# Patient Record
Sex: FEMALE | Race: WHITE
[De-identification: names, ages, dates, MRNs, and addresses within clinical notes are randomized per-mention and may not be internally consistent; named-entity substitution may affect disease eponyms.]

---

## 2017-01-05 ENCOUNTER — LACTATION ENCOUNTER (OUTPATIENT)
Age: 32
End: 2017-01-05

## 2017-01-05 NOTE — LACTATION NOTE
"This note was copied from the chart of Bety Guardado.  D:  I observed a breastfeeding (babe with hx of desats with oral feeds).  I:  Bety had been \"stirring\" before I came into the room and it had been 3 hours since last feeding, so Nancy got her up.  She did not display strong hunger cues nor seem very awake.  Mom did and excellent job with positioning and latching.  She stated she could feel a letdown coming and wondered if she should unlatch her; I told her to do what she normally did (not change her routine since I was there).  During the letdown Bety kept sucking but did not pause to breathe and had a heart rate dip and desat.  I then had Nancy unlatch her.  She relatched her a few more times and Bety would grow tired, breathe periodically and drop her sats.  Mom did a great job noting when her breathing became periodic and began stimulating her.  At first she would stim her while still latched jaime nd Bety would start to suck from the stimulation (vs breathe); I advised her to unlatch before stimulating so she could concentrate on breathing.  Mom initially wanted to relatch her when her color came back and she started breathing again; I advised her to wait until she was more awake than that (cueing more srongly; her face was still \"checked out\").  After about 10 minutes she did not wake and cue again; she was put in the kangaroo position to see if that would wake her and it did not; per scale she took 0ml.  After cuddling a little longer at breast she then perked up and took 42ml.  We also talked abotu her pumping; maternal grandma used to be an IBCLC and advised Nancy to pump less often to avoid oversupply.  I asked if she had ever logged and she had not, but stated she gets 4-6oz per pumping, 6-8x/day.  I showed her the math, that those volumes were a wide range between normal supply and oversupply (24-48 oz/day) and advised her to log before she changed up her pumpings too " "much.  I gave her a \"Magic Number\" sheet which describes how to safely and comfortably wean her pumping in the future while still maintaining supply.  A:  Immature, sleepy, tired baby who has a strong desire to suck but is immature in breathing pattern.  P:  Will continue to provide lactation support.    Ese Hall, RNC, IBCLC    "

## 2017-01-15 ENCOUNTER — LACTATION ENCOUNTER (OUTPATIENT)
Age: 32
End: 2017-01-15

## 2017-01-15 NOTE — LACTATION NOTE
"This note was copied from the chart of Bety Guardado.  Discharge Instructions for Bety guardado    Pumping:  Continue to pump after every feeding until Bety is no longer needing any supplements and is able to take all feedings at breast.  Then wean from pumping as described in the blue handout.    Nipple Shield:  Continue to use until Bety is taking all feedings at breast and suck is NOTICEABLY stronger, then wean as described in yellow handout.  Typically, this is the last to go (usually wean from bottles 1st, then the pump 2nd)    Supplementation:  Supplement as needed/ medically ordered.  Read through the purple handout on transitioning to full breastfeedings at home for the information it contains.    Additional Instructions:  Make sure Bety is eating at least 8 times a day, has at least 6-8 wet diapers in 24 hours, and 4 stools in 24 hours, to show adequate intake.  You may find a rental Babyweigh scale helpful in transitioning.    Birth Control and Other Medications: Avoid hormonal birth control for as long as possible and until your milk supply is well established, as it may impact your supply.  Some women also find decongestants and antihistamines may impact supply.  Always get a second opinion from a lactation consultant if told to stop breastfeeding or \"pump and dump\" when starting a new medication; most medications are compatible.    Growth Spurts: Common times for \"growth spurts\" are around 7-10 days, 2-3 weeks, 4-6 weeks, 3 months, 4 months, 6 months and 9 months, but these vary widely between babies.  During these times allow your baby to nurse very frequently (or pump more frequently) to temporarily boost your supply, as opposed to supplementing.  It should pass in a few days when your supply increases, and your baby will settle into a new feeding pattern.    Resources for rental scales:   [place] ([facility])       [phone]   [facility] ([facility])   [phone]  [facility])       [phone]     Outpatient lactation resources:   [facility] (Mon and Thurs) [phone]  [facility] at [facility]  [phone]   [facility] at [facility]   [phone]  [facility]    [phone]  [facility]       [phone]  [facility]      [phone]  [facility]      [phone]  [facility]      [phone]    [facility]       [phone]               BabyCafes ([email or web address]):  [facility] [place] (Thursdays 12:30-2:30)   [phone]  [facility] [place] ((Tuesdays (Tuesday 9:30-11:30)  [phone]  [facility] [place] (Wednesday 11a-1p)   [phone]  [facility] (Wednesdays (12n-2p)    [phone]  [facility] [place] (Mondays 10a-12n)    [phone]  [facility] (Wed 12:30p-2:30p)   [phone]  [facility] [place] (Wednesdays 10a-12n    [phone]  [facility] [place] (Mondays 10a-12n)    [phone]    Other Walk-In Lactaton Help and Resources  [facility] (Tues/Wed)   [phone]  [facility] (Thurs 2:30-3:30)   [phone]  [employer] Baby Weigh In (various times and locations)  [email or web address]    WIC (call for eligibility information)     [phone]    [employer]   www.llli.org  4-704-2-LA-LECHE ([phone])    Renu Barrow RNC, IBCLC/ Shirley Gannon RNC, IBCLC/ Ese Hall RNC, IBCLC [phone]

## 2017-01-15 NOTE — LACTATION NOTE
This note was copied from the chart of Bety Guardado.  D: I met with mom for discharge teaching.   I: I gave her a feeding log to use at home and went over the need for 8-12 feedings per day and how many wet diapers and stools she should see each day to show adequate intake. We discussed home storage of breast milk, weaning from the nipple shield and pumping, and transitioning to full breastfeeding at home.  I gave the mother handouts on all of these topics as well as extra nipple shields. We discussed growth spurts, birth control and other medications, paced bottlefeeding, Babyweigh rental scales, and resources for help at home/ when to seek outpatient help.  She verbalized understanding via teach back.   A: Mom has information and equipment she needs to feed her baby at home.   P: I encouraged her to call with any breastfeeding questions she may have in the future.

## 2017-02-13 ENCOUNTER — OFFICE VISIT (OUTPATIENT)
Dept: OBGYN | Facility: CLINIC | Age: 32
End: 2017-02-13
Attending: ADVANCED PRACTICE MIDWIFE
Payer: OTHER GOVERNMENT

## 2017-02-13 VITALS
HEART RATE: 75 BPM | WEIGHT: 139.3 LBS | BODY MASS INDEX: 22.39 KG/M2 | SYSTOLIC BLOOD PRESSURE: 111 MMHG | HEIGHT: 66 IN | DIASTOLIC BLOOD PRESSURE: 75 MMHG

## 2017-02-13 DIAGNOSIS — Z30.018 ENCOUNTER FOR INITIAL PRESCRIPTION OF OTHER CONTRACEPTIVES: Primary | ICD-10-CM

## 2017-02-13 PROCEDURE — 87624 HPV HI-RISK TYP POOLED RSLT: CPT | Performed by: ADVANCED PRACTICE MIDWIFE

## 2017-02-13 PROCEDURE — 99212 OFFICE O/P EST SF 10 MIN: CPT | Mod: ZF

## 2017-02-13 PROCEDURE — G0145 SCR C/V CYTO,THINLAYER,RESCR: HCPCS | Performed by: ADVANCED PRACTICE MIDWIFE

## 2017-02-13 PROCEDURE — G0476 HPV COMBO ASSAY CA SCREEN: HCPCS | Performed by: ADVANCED PRACTICE MIDWIFE

## 2017-02-13 RX ORDER — ACETAMINOPHEN AND CODEINE PHOSPHATE 120; 12 MG/5ML; MG/5ML
1 SOLUTION ORAL DAILY
Qty: 84 TABLET | Refills: 4 | Status: SHIPPED | OUTPATIENT
Start: 2017-02-13 | End: 2018-04-26

## 2017-02-13 RX ORDER — ACETAMINOPHEN AND CODEINE PHOSPHATE 120; 12 MG/5ML; MG/5ML
1 SOLUTION ORAL DAILY
Qty: 28 TABLET | Refills: 11 | Status: SHIPPED | OUTPATIENT
Start: 2017-02-13 | End: 2017-02-13

## 2017-02-13 RX ORDER — ACETAMINOPHEN AND CODEINE PHOSPHATE 120; 12 MG/5ML; MG/5ML
1 SOLUTION ORAL DAILY
Qty: 84 TABLET | Refills: 4 | Status: SHIPPED
Start: 2017-02-13 | End: 2017-02-13

## 2017-02-13 ASSESSMENT — ANXIETY QUESTIONNAIRES
1. FEELING NERVOUS, ANXIOUS, OR ON EDGE: SEVERAL DAYS
6. BECOMING EASILY ANNOYED OR IRRITABLE: NOT AT ALL
5. BEING SO RESTLESS THAT IT IS HARD TO SIT STILL: NOT AT ALL
3. WORRYING TOO MUCH ABOUT DIFFERENT THINGS: SEVERAL DAYS
2. NOT BEING ABLE TO STOP OR CONTROL WORRYING: SEVERAL DAYS
GAD7 TOTAL SCORE: 3
7. FEELING AFRAID AS IF SOMETHING AWFUL MIGHT HAPPEN: NOT AT ALL

## 2017-02-13 ASSESSMENT — PATIENT HEALTH QUESTIONNAIRE - PHQ9: 5. POOR APPETITE OR OVEREATING: NOT AT ALL

## 2017-02-13 NOTE — NURSING NOTE
SUBJECTIVE:   Nancy Guardado is here for her 6-week postpartum checkup.     PHQ-9 score: 0    Hx of Abuse:  No    Delivery Date: 16.    Delivering provider:  Martina Lopez CNM.    Type of delivery:  .  Perineum:  Tear.     Delivery complications: None  Infant gender:  girl, weight 5 pounds 2 oz.  Feeding Method:  .  Complications reported with feeding:  none, infant thriving .    Bleeding:  None.  Duration:  3-4 weeks.  Menses resumed:  No  Bowel/Urinary problems:  No    Contraception Planned:  oral contraceptive  She  has not had intercourse since delivery..

## 2017-02-13 NOTE — LETTER
"2017       RE: Nancy Guardado  [address]  [place] [address]     Dear Colleague,    Thank you for referring your patient, Nancy Guardado, to the [facility] at [facility]. Please see a copy of my visit note below.      Nursing Notes:   Edwina Lr  2017 11:11 AM  Incomplete  SUBJECTIVE:   Nancy Guardado is here for her 6-week postpartum checkup.     PHQ-9 score:   Hx of Abuse:  No    Delivery Date: 16.    Delivering provider:  Martina Lopez CNM.    Type of delivery:  .  Perineum:  Tear.     Delivery complications: None  Infant gender:  girl, weight 5 pounds 2 oz.  Feeding Method:  .  Complications reported with feeding:  none, infant thriving .    Bleeding:  None.  Duration:  3-4 weeks.  Menses resumed:  No  Bowel/Urinary problems:  No    Contraception Planned:  oral contraceptive  She  has not had intercourse since delivery..       ================================================================  Nancy Guardado is a 31 y.o.  female s/p  on 16 following PPROM. Pt initially was followed by OB MD team in labor and transferred back to [facility] service after becoming 34 weeks. Delivery was uncomplicated- bilateral periurethral lacerations, qbl 200ml.     Patient states she is feeling well. States that baby girl, Bety \"Sumi\" Lata is healthy and gaining weight. Reports baby was 5lb 2oz at birth, is now 7lb 8oz. Spent 1 month in the NICU, mostly for feeding concerns. She spent most of her time in the NICU with baby and states this was exhausting but feels proud that they made it through that hard time and now baby is home. She is now breastfeeding on demand as well as feeding 3 bottles of fortified pumped breast milk daily.     Pt states mood is good. She feels well supported at home. She had in laws in town for several weeks over the holidays. 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  is doing well; still working nights.    She states that she no longer has vaginal bleeding. Denies urinary s/s, vaginal discharge, irritation or odor. Has not resumed sexual intercourse. Reports last pap was in 2012 or 2013- needs pap with hpv cotesting today. Would like to start the minipill for contraception.    ROS: 10 point ROS neg other than the symptoms noted above in the HPI.   CONSTITUTIONAL: negative  RESPIRATORY: negative  CARDIOVASCULAR: negative  GI: negative  : negative  PSYCHIATRIC: negative    EXAM:  /75 (BP Location: Left arm, Cuff Size: Adult Regular)  Pulse 75  Ht 1.676 m (5' 5.98\")  Wt 63.2 kg (139 lb 4.8 oz)  LMP 04/20/2016 (Exact Date)  BMI 22.5 kg/m2    General: healthy, alert and no distress  Psych: negative for sleep disturbance, anxiety, nervous breakdown, depression, thoughts of self-harm, thoughts of hurting someone else, agitation, hallucinations, sexual difficulties, marital problems, abusive relationship, excessive alcohol consumption and illegal drug usage  Last PHQ-9 score on record= 0,    Breasts:  Lactating, Nipples intact with no lesions, Non-tender and No S/S of yeast or mastitis  Abdomen: Benign, Soft, flat, non-tender, No masses, organomegaly and Diastasis less than 1-2 FB  Incision:  None   Vulva:  Normal genitalia and Bartholin's, Urethra, [place]'s normal  Vagina:  normal with good muscle tone, without discharge. Well healed s/p bilateral periurethral lacerations.  Cervix:  Multiparous, and pink, moist, closed, without lesion or CMT.    Uterus:  fully involuted and non-tender    Adnexa:  Within normal limits and No masses, nodularity, tenderness  Recto-vaginal:   anus normal      ASSESSMENT:   Encounter Diagnoses   Name Primary?     Routine postpartum follow-up      Postpartum care and examination of lactating mother      Encounter for initial prescription of other " contraceptives Yes      Normal postpartum exam after    Pregnancy was complicated by:  PPROM and PTB at 34 weeks.      PLAN:  Orders Placed This Encounter   Procedures     Obtaining, preparing and conveyance of cervical or vaginal smear to laboratory.     Pap imaged thin layer screen with HPV - recommended age 30 - 65 years (select HPV order below)     HPV High Risk Types DNA Cervical      Orders Placed This Encounter   Medications                                                norethindrone (MICRONOR) 0.35 MG per tablet     Sig: Take 1 tablet (0.35 mg) by mouth daily     Dispense:  84 tablet     Refill:  4      Postpartum education reviewed:    Signs and symptoms of postpartum depression/anxiety discussed and resources offered.  Discussed calcium intake, vitamins and supplements including Vitamin D. Continue prenatal vitamin.  Exercise, increased fiber, increased fluid, kegel exercises, self breast exam encouraged.    - Contraception methods discussed. Patient desires to start minipill.  - Prescription given for minipill. Discussed indication, proper use. To use back up contraception x 2 weeks. Return to clinic if/when desire any change in contraception.  - Discussed use of long-acting water soluble lubricant for comfort with resumption of sexual intercourse.  - Reviewed  birth. Discussed and provided handouts for 17P progesterone injections with next pregnancy. Pt expressed interest for future pregnancies.  -Pap with HPV cotesting obtained. If wnl, next pap with cotesting in 5 years.   - RTC for annual exam in 1 year or prn for question/concerns    REN Armstrong CNM

## 2017-02-13 NOTE — PROGRESS NOTES
"  Nursing Notes:   Edwina Lr  2017 11:11 AM  Incomplete  SUBJECTIVE:   Nancy Guardado is here for her 6-week postpartum checkup.     PHQ-9 score:   Hx of Abuse:  No    Delivery Date: 16.    Delivering provider:  Martina Lopez CNM.    Type of delivery:  .  Perineum:  Tear.     Delivery complications: None  Infant gender:  girl, weight 5 pounds 2 oz.  Feeding Method:  .  Complications reported with feeding:  none, infant thriving .    Bleeding:  None.  Duration:  3-4 weeks.  Menses resumed:  No  Bowel/Urinary problems:  No    Contraception Planned:  oral contraceptive  She  has not had intercourse since delivery..       ================================================================  Nancy Guardado is a 31 y.o.  female s/p  on 16 following PPROM. Pt initially was followed by OB MD team in labor and transferred back to [facility] service after becoming 34 weeks. Delivery was uncomplicated- bilateral periurethral lacerations, qbl 200ml.     Patient states she is feeling well. States that baby girl, Bety \"Sumi\" Lata is healthy and gaining weight. Reports baby was 5lb 2oz at birth, is now 7lb 8oz. Spent 1 month in the NICU, mostly for feeding concerns. She spent most of her time in the NICU with baby and states this was exhausting but feels proud that they made it through that hard time and now baby is home. She is now breastfeeding on demand as well as feeding 3 bottles of fortified pumped breast milk daily.     Pt states mood is good. She feels well supported at home. She had in laws in town for several weeks over the holidays. 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  is doing well; still working nights.    She states that she no longer has vaginal bleeding. Denies urinary s/s, vaginal discharge, irritation or odor. Has not resumed sexual intercourse. " "Reports last pap was in  or - needs pap with hpv cotesting today. Would like to start the minipill for contraception.    ROS: 10 point ROS neg other than the symptoms noted above in the HPI.   CONSTITUTIONAL: negative  RESPIRATORY: negative  CARDIOVASCULAR: negative  GI: negative  : negative  PSYCHIATRIC: negative    EXAM:  /75 (BP Location: Left arm, Cuff Size: Adult Regular)  Pulse 75  Ht 1.676 m (5' 5.98\")  Wt 63.2 kg (139 lb 4.8 oz)  LMP 2016 (Exact Date)  BMI 22.5 kg/m2    General: healthy, alert and no distress  Psych: negative for sleep disturbance, anxiety, nervous breakdown, depression, thoughts of self-harm, thoughts of hurting someone else, agitation, hallucinations, sexual difficulties, marital problems, abusive relationship, excessive alcohol consumption and illegal drug usage  Last PHQ-9 score on record= 0,    Breasts:  Lactating, Nipples intact with no lesions, Non-tender and No S/S of yeast or mastitis  Abdomen: Benign, Soft, flat, non-tender, No masses, organomegaly and Diastasis less than 1-2 FB  Incision:  None   Vulva:  Normal genitalia and Bartholin's, Urethra, [place]'s normal  Vagina:  normal with good muscle tone, without discharge. Well healed s/p bilateral periurethral lacerations.  Cervix:  Multiparous, and pink, moist, closed, without lesion or CMT.    Uterus:  fully involuted and non-tender    Adnexa:  Within normal limits and No masses, nodularity, tenderness  Recto-vaginal:   anus normal      ASSESSMENT:   Encounter Diagnoses   Name Primary?     Routine postpartum follow-up      Postpartum care and examination of lactating mother      Encounter for initial prescription of other contraceptives Yes      Normal postpartum exam after    Pregnancy was complicated by:  PPROM and PTB at 34 weeks.      PLAN:  Orders Placed This Encounter   Procedures     Obtaining, preparing and conveyance of cervical or vaginal smear to laboratory.     Pap imaged thin layer " screen with HPV - recommended age 30 - 65 years (select HPV order below)     HPV High Risk Types DNA Cervical      Orders Placed This Encounter   Medications                                                norethindrone (MICRONOR) 0.35 MG per tablet     Sig: Take 1 tablet (0.35 mg) by mouth daily     Dispense:  84 tablet     Refill:  4      Postpartum education reviewed:    Signs and symptoms of postpartum depression/anxiety discussed and resources offered.  Discussed calcium intake, vitamins and supplements including Vitamin D. Continue prenatal vitamin.  Exercise, increased fiber, increased fluid, kegel exercises, self breast exam encouraged.    - Contraception methods discussed. Patient desires to start minipill.  - Prescription given for minipill. Discussed indication, proper use. To use back up contraception x 2 weeks. Return to clinic if/when desire any change in contraception.  - Discussed use of long-acting water soluble lubricant for comfort with resumption of sexual intercourse.  - Reviewed  birth. Discussed and provided handouts for 17P progesterone injections with next pregnancy. Pt expressed interest for future pregnancies.  -Pap with HPV cotesting obtained. If wnl, next pap with cotesting in 5 years.   - RTC for annual exam in 1 year or prn for question/concerns    REN Armstrong, BIBI

## 2017-02-13 NOTE — MR AVS SNAPSHOT
After Visit Summary   2/13/2017    Nancy Guardado    MRN: [id number]           Patient Information     Date Of Birth          1985        Visit Information        Provider Department      2/13/2017 11:15 AM Kat Medina CNM [facility]        Today's Diagnoses     Encounter for initial prescription of other contraceptives    -  1    Routine postpartum follow-up        Postpartum care and examination of lactating mother           Follow-ups after your visit        Follow-up notes from your care team     Return in about 1 year (around 2/13/2018), or annual exam.      Who to contact     Please call your clinic at [phone] to:    Ask questions about your health    Make or cancel appointments    Discuss your medicines    Learn about your test results    Speak to your doctor   If you have compliments or concerns about an experience at your clinic, or if you wish to file a complaint, please contact [facility] Patient Relations at [phone] or email us at [email or web address]         Additional Information About Your Visit        MyChart Information     [employer] gives you secure access to your electronic health record. If you see a primary care provider, you can also send messages to your care team and make appointments. If you have questions, please call your primary care clinic.  If you do not have a primary care provider, please call [phone] and they will assist you.      [employer] is an electronic gateway that provides easy, online access to your medical records. With [employer], you can request a clinic appointment, read your test results, renew a prescription or communicate with your care team.     To access your existing account, please contact your [facility] or call [phone] for assistance.        Care EveryWhere ID     This is your Care EveryWhere ID. This could be used by  "other organizations to access your [place] medical records  [id number]        Your Vitals Were     Pulse Height Last Period BMI (Body Mass Index)          75 1.676 m (5' 5.98\") 04/20/2016 (Exact Date) 22.5 kg/m2         Blood Pressure from Last 3 Encounters:   02/13/17 111/75   12/16/16 115/84   12/12/16 116/74    Weight from Last 3 Encounters:   02/13/17 63.2 kg (139 lb 4.8 oz)   12/13/16 73.5 kg (162 lb)   12/12/16 73.5 kg (162 lb)              We Performed the Following     HPV High Risk Types DNA Cervical     Obtaining, preparing and conveyance of cervical or vaginal smear to laboratory.     Pap imaged thin layer screen with HPV - recommended age 30 - 65 years (select HPV order below)          Today's Medication Changes          These changes are accurate as of: 2/13/17 11:59 PM.  If you have any questions, ask your nurse or doctor.               Start taking these medicines.        Dose/Directions    norethindrone 0.35 MG per tablet   Commonly known as:  MICRONOR   Used for:  Postpartum care and examination of lactating mother, Encounter for initial prescription of other contraceptives   Started by:  Kat Medina CNM        Dose:  1 tablet   Take 1 tablet (0.35 mg) by mouth daily   Quantity:  84 tablet   Refills:  4         Stop taking these medicines if you haven't already. Please contact your care team if you have questions.     calcium-magnesium 500-250 MG Tabs per tablet   Commonly known as:  CALMAG   Stopped by:  Kat Medina CNM           ferrous fumarate 65 mg ([place]. FE)-Vitamin C 125 mg  MG Tabs tablet   Commonly known as:  VITRON C   Stopped by:  Kat Medina CNM           ibuprofen 200 MG tablet   Commonly known as:  ADVIL/MOTRIN   Stopped by:  Kat Medina CNM           OMEGA-3 FISH OIL PO   Stopped by:  Kat Medina CNM           senna-docusate 8.6-50 MG per tablet   Commonly " known as:  SENOKOT-S;PERICOLACE   Stopped by:  Kat Medina CNM                Where to get your medicines      Some of these will need a paper prescription and others can be bought over the counter.  Ask your nurse if you have questions.     Bring a paper prescription for each of these medications     norethindrone 0.35 MG per tablet                Primary Care Provider Office Phone # Fax #    [facility] [phone]       [facility]        Thank you!     Thank you for choosing [facility]  for your care. Our goal is always to provide you with excellent care. Hearing back from our patients is one way we can continue to improve our services. Please take a few minutes to complete the written survey that you may receive in the mail after your visit with us. Thank you!             Your Updated Medication List - Protect others around you: Learn how to safely use, store and throw away your medicines at www.disposemymeds.org.          This list is accurate as of: 2/13/17 11:59 PM.  Always use your most recent med list.                   Brand Name Dispense Instructions for use    norethindrone 0.35 MG per tablet    MICRONOR    84 tablet    Take 1 tablet (0.35 mg) by mouth daily       PRENATAL VITAMIN PO

## 2017-02-14 ASSESSMENT — PATIENT HEALTH QUESTIONNAIRE - PHQ9: SUM OF ALL RESPONSES TO PHQ QUESTIONS 1-9: 0

## 2017-02-14 ASSESSMENT — ANXIETY QUESTIONNAIRES: GAD7 TOTAL SCORE: 3

## 2017-02-15 LAB
COPATH REPORT: NORMAL
PAP: NORMAL

## 2017-02-17 LAB
FINAL DIAGNOSIS: NORMAL
HPV HR 12 DNA CVX QL NAA+PROBE: NEGATIVE
HPV16 DNA SPEC QL NAA+PROBE: NEGATIVE
HPV18 DNA SPEC QL NAA+PROBE: NEGATIVE
SPECIMEN DESCRIPTION: NORMAL

## 2018-04-26 ENCOUNTER — OFFICE VISIT (OUTPATIENT)
Dept: URGENT CARE | Facility: URGENT CARE | Age: 33
End: 2018-04-26
Payer: COMMERCIAL

## 2018-04-26 VITALS
BODY MASS INDEX: 23.58 KG/M2 | OXYGEN SATURATION: 97 % | HEART RATE: 85 BPM | DIASTOLIC BLOOD PRESSURE: 69 MMHG | WEIGHT: 146 LBS | SYSTOLIC BLOOD PRESSURE: 121 MMHG | TEMPERATURE: 97 F

## 2018-04-26 DIAGNOSIS — S39.012A BACK STRAIN, INITIAL ENCOUNTER: Primary | ICD-10-CM

## 2018-04-26 PROCEDURE — 99203 OFFICE O/P NEW LOW 30 MIN: CPT | Performed by: FAMILY MEDICINE

## 2018-04-26 RX ORDER — CYCLOBENZAPRINE HCL 5 MG
5 TABLET ORAL EVERY 8 HOURS PRN
Qty: 30 TABLET | Refills: 0 | Status: SHIPPED | OUTPATIENT
Start: 2018-04-26

## 2018-04-26 NOTE — LETTER
April 26, 2018      Nancy Guardado  [address]  [place] [address]        To Whom It May Concern:    Nancy Guardado  was seen on 4/26.  Please allow her to reschedule her fitness test to later date in May due to back injury.        Sincerely,        Anatoly Marion MD

## 2018-04-26 NOTE — PATIENT INSTRUCTIONS
Okay to take ibuprofen 200 mg - 4 tablets (800 mg) every 8 hours as needed.  Okay to take tylenol 500 mg - 2 tablets (1000 mg) every 6-8 hours as needed, do not exceed 3000 mg in 24 hours.  Okay to take flexeril 5 mg - 1 to 2 tablets every 8 hours as needed.  Heat or ice to area as needed.      Back Sprain or Strain    Injury to the muscles (strain) or ligaments (sprain) around the spine can be troubling. Injury may occur after a sudden forceful twisting or bending force such as in a car accident, after a simple awkward movement, or after lifting something heavy with poor body positioning. In any case, muscle spasm is often present and adds to the pain.  Thankfully, most people feel better in 1 to 2 weeks, and most of the rest in 1 to 2 months. Most people can remain active. Unless you had a forceful or traumatic physical injury such as a car accident or fall, X-rays may not be ordered for the first evaluation of a back sprain or strain. If pain continues and does not respond to medical treatment, your healthcare provider may then order X-rays and other tests.  Home care  The following guidelines will help you care for your injury at home:    When in bed, try to find a comfortable position. A firm mattress is best. Try lying flat on your back with pillows under your knees. You can also try lying on your side with your knees bent up toward your chest and a pillow between your knees.    Don't sit for long periods. Try not to take long car rides or take other trips that have you sitting for a long time. This puts more stress on the lower back than standing or walking.    During the first 24 to 72 hours after an injury or flare-up, apply an ice pack to the painful area for 20 minutes. Then remove it for 20 minutes. Do this for 60 to 90 minutes, or several times a day. This will reduce swelling and pain. Be sure to wrap the ice pack in a thin towel or plastic to protect your skin.    You can start with ice, then switch  to heat. Heat from a hot shower, hot bath, or heating pad reduces pain and works well for muscle spasms. Put heat on the painful area for 20 minutes, then remove for 20 minutes. Do this for 60 to 90 minutes, or several times a day. Do not use a heating pad while sleeping. It can burn the skin.    You can alternate the ice and heat. Talk with your healthcare provider to find out the best treatment or therapy for your back pain.    Therapeutic massage will help relax the back muscles without stretching them.    Be aware of safe lifting methods. Do not lift anything over 15 pounds until all of the pain is gone.  Medicines  Talk to your healthcare provider before using medicines, especially if you have other health problems or are taking other medicines.    You may use acetaminophen or ibuprofen to control pain, unless another pain medicine was prescribed. If you have chronic conditions like diabetes, liver or kidney disease, stomach ulcers, or gastrointestinal bleeding, or are taking blood-thinner medicines, talk with your doctor before taking any medicines.    Be careful if you are given prescription medicines, narcotics, or medicine for muscle spasm. They can cause drowsiness, and affect your coordination, reflexes, and judgment. Do not drive or operate heavy machinery when taking these types of medicines. Only take pain medicine as prescribed by your healthcare provider.  Follow-up care  Follow up with your healthcare provider, or as advised. You may need physical therapy or more tests if your symptoms get worse.  If you had X-rays your healthcare provider may be checking for any broken bones, breaks, or fractures. Bruises and sprains can sometimes hurt as much as a fracture. These injuries can take time to heal completely. If your symptoms don t improve or they get worse, talk with your healthcare provider. You may need a repeat X-ray or other tests.  Call 911  Call 911 if any of the following occur:    Trouble  breathing    Confused    Very drowsy or trouble awakening    Fainting or loss of consciousness    Rapid or very slow heart rate    Loss of bowel or bladder control  When to seek medical advice  Call your healthcare provider right away if any of the following occur:    Pain gets worse or spreads to your arms or legs    Weakness or numbness in one or both arms or legs    Numbness in the groin or genital area  Date Last Reviewed: 6/1/2016 2000-2017 [employer]. [address] [place] [address], [place] [address]. All rights reserved. This information is not intended as a substitute for professional medical care. Always follow your healthcare professional's instructions.

## 2018-04-26 NOTE — MR AVS SNAPSHOT
After Visit Summary   4/26/2018    Nancy Guardado    MRN: [id number]           Patient Information     Date Of Birth          1985        Visit Information        Provider Department      4/26/2018 9:10 AM Anatoly Marion MD [facility]        Today's Diagnoses     Back strain, initial encounter    -  1      Care Instructions    Okay to take ibuprofen 200 mg - 4 tablets (800 mg) every 8 hours as needed.  Okay to take tylenol 500 mg - 2 tablets (1000 mg) every 6-8 hours as needed, do not exceed 3000 mg in 24 hours.  Okay to take flexeril 5 mg - 1 to 2 tablets every 8 hours as needed.  Heat or ice to area as needed.      Back Sprain or Strain    Injury to the muscles (strain) or ligaments (sprain) around the spine can be troubling. Injury may occur after a sudden forceful twisting or bending force such as in a car accident, after a simple awkward movement, or after lifting something heavy with poor body positioning. In any case, muscle spasm is often present and adds to the pain.  Thankfully, most people feel better in 1 to 2 weeks, and most of the rest in 1 to 2 months. Most people can remain active. Unless you had a forceful or traumatic physical injury such as a car accident or fall, X-rays may not be ordered for the first evaluation of a back sprain or strain. If pain continues and does not respond to medical treatment, your healthcare provider may then order X-rays and other tests.  Home care  The following guidelines will help you care for your injury at home:    When in bed, try to find a comfortable position. A firm mattress is best. Try lying flat on your back with pillows under your knees. You can also try lying on your side with your knees bent up toward your chest and a pillow between your knees.    Don't sit for long periods. Try not to take long car rides or take other trips that have you sitting for a long time. This puts more stress on the lower back than standing or  walking.    During the first 24 to 72 hours after an injury or flare-up, apply an ice pack to the painful area for 20 minutes. Then remove it for 20 minutes. Do this for 60 to 90 minutes, or several times a day. This will reduce swelling and pain. Be sure to wrap the ice pack in a thin towel or plastic to protect your skin.    You can start with ice, then switch to heat. Heat from a hot shower, hot bath, or heating pad reduces pain and works well for muscle spasms. Put heat on the painful area for 20 minutes, then remove for 20 minutes. Do this for 60 to 90 minutes, or several times a day. Do not use a heating pad while sleeping. It can burn the skin.    You can alternate the ice and heat. Talk with your healthcare provider to find out the best treatment or therapy for your back pain.    Therapeutic massage will help relax the back muscles without stretching them.    Be aware of safe lifting methods. Do not lift anything over 15 pounds until all of the pain is gone.  Medicines  Talk to your healthcare provider before using medicines, especially if you have other health problems or are taking other medicines.    You may use acetaminophen or ibuprofen to control pain, unless another pain medicine was prescribed. If you have chronic conditions like diabetes, liver or kidney disease, stomach ulcers, or gastrointestinal bleeding, or are taking blood-thinner medicines, talk with your doctor before taking any medicines.    Be careful if you are given prescription medicines, narcotics, or medicine for muscle spasm. They can cause drowsiness, and affect your coordination, reflexes, and judgment. Do not drive or operate heavy machinery when taking these types of medicines. Only take pain medicine as prescribed by your healthcare provider.  Follow-up care  Follow up with your healthcare provider, or as advised. You may need physical therapy or more tests if your symptoms get worse.  If you had X-rays your healthcare provider  may be checking for any broken bones, breaks, or fractures. Bruises and sprains can sometimes hurt as much as a fracture. These injuries can take time to heal completely. If your symptoms don t improve or they get worse, talk with your healthcare provider. You may need a repeat X-ray or other tests.  Call 911  Call 911 if any of the following occur:    Trouble breathing    Confused    Very drowsy or trouble awakening    Fainting or loss of consciousness    Rapid or very slow heart rate    Loss of bowel or bladder control  When to seek medical advice  Call your healthcare provider right away if any of the following occur:    Pain gets worse or spreads to your arms or legs    Weakness or numbness in one or both arms or legs    Numbness in the groin or genital area  Date Last Reviewed: 6/1/2016 2000-2017 [employer]. [address] [place] [address]. All rights reserved. This information is not intended as a substitute for professional medical care. Always follow your healthcare professional's instructions.                Follow-ups after your visit        Additional Services     [facility] PT, HAND, AND CHIROPRACTIC REFERRAL       **This order will print in the [facility] Scheduling Office**    Physical Therapy, Hand Therapy and Chiropractic Care are available through:    *[facility]  *[facility]  *[facility]    Call one number to schedule at any of the above locations: [phone].    Your provider has referred you to: Physical Therapy at [facility] or [facility]    Indication/Reason for Referral: Low Back Pain  Onset of Illness: 1 day ago  Therapy Orders: Evaluate and Treat  Special Programs:   Special Request:     Nasim Rouse      Additional Comments for the Therapist or Chiropractor:     Please be aware that coverage of these services is subject to the terms and limitations of your health insurance plan.  Call member services at your health plan with any  benefit or coverage questions.      Please bring the following to your appointment:    *Your personal calendar for scheduling future appointments  *Comfortable clothing                  Who to contact     If you have questions or need follow up information about today's clinic visit or your schedule please contact [facility] directly at [phone].  Normal or non-critical lab and imaging results will be communicated to you by MyChart, letter or phone within 4 business days after the clinic has received the results. If you do not hear from us within 7 days, please contact the clinic through [employer] or phone. If you have a critical or abnormal lab result, we will notify you by phone as soon as possible.  Submit refill requests through [employer] or call your pharmacy and they will forward the refill request to us. Please allow 3 business days for your refill to be completed.          Additional Information About Your Visit        MyChart Information     [employer] gives you secure access to your electronic health record. If you see a primary care provider, you can also send messages to your care team and make appointments. If you have questions, please call your primary care clinic.  If you do not have a primary care provider, please call [phone] and they will assist you.        Care EveryWhere ID     This is your Care EveryWhere ID. This could be used by other organizations to access your [facility] medical records  [id number]        Your Vitals Were     Pulse Temperature Pulse Oximetry BMI (Body Mass Index)          85 97  F (36.1  C) (Tympanic) 97% 23.58 kg/m2         Blood Pressure from Last 3 Encounters:   04/26/18 121/69   02/13/17 111/75   12/16/16 115/84    Weight from Last 3 Encounters:   04/26/18 146 lb (66.2 kg)   02/13/17 139 lb 4.8 oz (63.2 kg)   12/13/16 162 lb (73.5 kg)              We Performed the Following     [facility] PT, HAND, AND CHIR[facility]RACTIC REFERRAL          Today's Medication  Changes          These changes are accurate as of 4/26/18  9:45 AM.  If you have any questions, ask your nurse or doctor.               Start taking these medicines.        Dose/Directions    cyclobenzaprine 5 MG tablet   Commonly known as:  FLEXERIL   Used for:  Back strain, initial encounter   Started by:  Anatoly Marion MD        Dose:  5 mg   Take 1 tablet (5 mg) by mouth every 8 hours as needed   Quantity:  30 tablet   Refills:  0            Where to get your medicines      These medications were sent to [facility] [place] - [place] - [address]   [address]  [address], [place] [address]     Phone:  [phone]     cyclobenzaprine 5 MG tablet                Primary Care Provider Fax #    Physician No Ref-Primary [phone]       No address on file        Equal Access to Services     JAVIER PADILLA : Rivera alvarezo Sobertha, waaxda luqadaha, qaybta kaalmada adeegyada, maki bird . So [facility] [phone].    ATENCIÓN: Si habla español, tiene a fang disposición servicios gratuitos de asistencia lingüística. Llame al [phone].    We comply with applicable federal civil rights laws and [place] laws. We do not discriminate on the basis of race, color, national origin, age, disability, sex, sexual orientation, or gender identity.            Thank you!     Thank you for choosing [facility]  for your care. Our goal is always to provide you with excellent care. Hearing back from our patients is one way we can continue to improve our services. Please take a few minutes to complete the written survey that you may receive in the mail after your visit with us. Thank you!             Your Updated Medication List - Protect others around you: Learn how to safely use, store and throw away your medicines at www.disposemymeds.org.          This list is accurate as of 4/26/18  9:45 AM.  Always use your most recent med list.                    Brand Name Dispense Instructions for use Diagnosis    cyclobenzaprine 5 MG tablet    FLEXERIL    30 tablet    Take 1 tablet (5 mg) by mouth every 8 hours as needed    Back strain, initial encounter       PRENATAL VITAMIN PO

## 2018-04-26 NOTE — PROGRESS NOTES
"SUBJECTIVE:  Chief Complaint   Patient presents with     Urgent Care     Back Pain     Pt states last night was doing squats with daughter and hurt lower back.      Nancy Guardado is a 32 year old female presents with a chief complaint of back pain.  The injury occurred 1 day(s) ago.   The injury happened while at home. How: was exercising and strained while doing squats, was holding her 1 year old daughter, felt more left sided.  The patient complained of moderate pain and has had decreased ROM when got up this morning.  Pain exacerbated by movement.  Relieved by nothing.  She treated it initially with Ibuprofen. This is not the first time this type of injury has occurred to this patient.  Last time had back flare was about 3 years ago.  Denies any sciatica.    Patient has fitness test for the [place] about 1 week from now and is concerned that may not be ready for this.    Currently breastfeeding 16 month old daughter, mostly for comfort.    Past Medical History:   Diagnosis Date     Anxiety     Symptoms happen at night As needed took lorazapam, Ambien     Kidney abnormality of fetus on prenatal ultrasound 2016    Renal pelvis 3.1mm on level II US performed on 2016 : Follow up u/s done, \"Fetal anatomy normal, Renal pelves measure normally,\" EFW 1373, 74%tile, BRYAN 20.8      Panic attacks     Started in [place], has been on several medications      labor 2016     Current Outpatient Prescriptions   Medication Sig Dispense Refill     Prenatal Vit-Fe Fumarate-FA (PRENATAL VITAMIN PO)        norethindrone (MICRONOR) 0.35 MG per tablet Take 1 tablet (0.35 mg) by mouth daily (Patient not taking: Reported on 2018) 84 tablet 4     Social History   Substance Use Topics     Smoking status: Former Smoker     Years: 10.00     Start date: 2012     Smokeless tobacco: Never Used      Comment: on/off     Alcohol use No      Comment: not during pregnancy       ROS:  CONSTITUTIONAL:NEGATIVE " for fever, chills, change in weight  INTEGUMENTARY/SKIN: NEGATIVE for worrisome rashes, moles or lesions  ENT/MOUTH: NEGATIVE for ear, mouth and throat problems  RESP:NEGATIVE for significant cough or SOB  CV: NEGATIVE for chest pain, palpitations or peripheral edema  MUSCULOSKELETAL: POSITIVE  for back pain   PSYCHIATRIC: NEGATIVE for changes in mood or affect    EXAM:   /69 (BP Location: Right arm, Patient Position: Chair, Cuff Size: Adult Regular)  Pulse 85  Temp 97  F (36.1  C) (Tympanic)  Wt 146 lb (66.2 kg)  SpO2 97%  BMI 23.58 kg/m2  Gen: healthy,alert,no distress  Extremity: back has tenderness L3-4 region, paraspinal muscle discomfort L>R.  Decrease ROM   There is not compromise to the distal circulation.  Pulses are +2 and CRT is brisk  EXTREMITIES: peripheral pulses normal  SKIN: no suspicious lesions or rashes  PSYCH: alert, affect - bright    X-RAY was not done.    ASSESSMENT/PLAN:   (S39.012A) Back strain, initial encounter  (primary encounter diagnosis)  Plan: cyclobenzaprine (FLEXERIL) 5 MG tablet, [facility] PT,        HAND, AND CHIROPRACTIC REFERRAL            Reassurance given, reviewed symptomatic treatment, rest, ice or heat.  RX flexeril given to help with muscle spasm, reviewed that symptoms usually worse the following days after initial injury before improving.  Referral placed for physical therapy to use if desires.    Letter given to patient to allow to reschedule exercise testing    Follow up if no resolution of symptoms.    Anatoly Marion MD  April 26, 2018 9:54 AM

## 2018-07-07 ENCOUNTER — OFFICE VISIT (OUTPATIENT)
Dept: URGENT CARE | Facility: URGENT CARE | Age: 33
End: 2018-07-07
Payer: COMMERCIAL

## 2018-07-07 VITALS
SYSTOLIC BLOOD PRESSURE: 136 MMHG | BODY MASS INDEX: 22.82 KG/M2 | HEART RATE: 90 BPM | DIASTOLIC BLOOD PRESSURE: 67 MMHG | TEMPERATURE: 97.4 F | OXYGEN SATURATION: 99 % | HEIGHT: 66 IN | WEIGHT: 142 LBS

## 2018-07-07 DIAGNOSIS — J20.8 ACUTE BRONCHITIS, VIRAL: Primary | ICD-10-CM

## 2018-07-07 PROCEDURE — 99213 OFFICE O/P EST LOW 20 MIN: CPT | Performed by: INTERNAL MEDICINE

## 2018-07-07 RX ORDER — ALBUTEROL SULFATE 90 UG/1
2 AEROSOL, METERED RESPIRATORY (INHALATION) EVERY 6 HOURS PRN
Qty: 1 INHALER | Refills: 0 | Status: SHIPPED | OUTPATIENT
Start: 2018-07-07

## 2018-07-07 RX ORDER — FLUTICASONE PROPIONATE 50 MCG
1-2 SPRAY, SUSPENSION (ML) NASAL DAILY
Qty: 1 BOTTLE | Refills: 0 | Status: SHIPPED | OUTPATIENT
Start: 2018-07-07

## 2018-07-07 ASSESSMENT — ENCOUNTER SYMPTOMS: FEVER: 0

## 2018-07-07 NOTE — MR AVS SNAPSHOT
After Visit Summary   7/7/2018    Nancy Guardado    MRN: [id number]           Patient Information     Date Of Birth          1985        Visit Information        Provider Department      7/7/2018 10:40 AM Aida Martinez MD [facility]        Today's Diagnoses     Acute bronchitis, viral    -  1      Care Instructions    Albuterol inhaler  flonase nasal spray    Call or return to clinic if symptoms worsen or fail to improve as anticipated.      Viral Bronchitis (Adult)    You have a viral bronchitis. Bronchitis is inflammation and swelling of the lining of the lungs. This is often caused by an infection. Symptoms include a dry, hacking cough that is worse at night. The cough may bring up yellow-green mucus. You may also feel short of breath or wheeze. Other symptoms may include tiredness, chest discomfort, and chills.  Bronchitis that is caused by a virus is not treated with antibiotics. Instead, medicines may be given to help relieve symptoms. Symptoms can last up to 2 weeks, although the cough may last much longer.  This illness is contagious during the first few days and is spread through the air by coughing and sneezing, or by direct contact (touching the sick person and then touching your own eyes, nose, or mouth).  Most viral illnesses resolve within 10 to 14 days with rest and simple home remedies, although they may sometimes last for several weeks.  Home care    If symptoms are severe, rest at home for the first 2 to 3 days. When you go back to your usual activities, don't let yourself get too tired.    Do not smoke. Also avoid being exposed to secondhand smoke.    You may use over-the-counter medicine to control fever or pain, unless another pain medicine was prescribed. If you have chronic liver or kidney disease or have ever had a stomach ulcer or gastrointestinal bleeding, talk with your healthcare provider before using these medicines. Also talk to  your provider if you are taking medicine to prevent blood clots. Aspirin should never be given to anyone younger than 18 years of age who is ill with a viral infection or fever. It may cause severe liver or brain damage.    Your appetite may be poor, so a light diet is fine. Avoid dehydration by drinking 6 to 8 glasses of fluids per day (such as water, soft drinks, sports drinks, juices, tea, or soup). Extra fluids will help loosen secretions in the nose and lungs.    Over-the-counter cough, cold, and sore-throat medicines will not shorten the length of the illness, but they may help to reduce symptoms. Don't use decongestants if you have high blood pressure.  Follow-up care  Follow up with your healthcare provider, or as advised. If you had an X-ray or ECG (electrocardiogram), a specialist will review it. You will be notified of any new findings that may affect your care.  If you are age 65 or older, or if you have a chronic lung disease or condition that affects your immune system, or you smoke, ask your healthcare provider about getting a pneumococcal vaccine and a yearly flu shot (influenza vaccine).  When to seek medical advice  Call your healthcare provider right away if any of these occur:    Fever of 100.4 F (38 C) or higher, or as directed by your healthcare provider    Coughing up increased amounts of colored sputum    Weakness, drowsiness, headache, facial pain, ear pain, or a stiff neck  Call 911  Call 911 if any of these occur:    Coughing up blood    Worsening weakness, drowsiness, headache, or stiff neck    Trouble breathing, wheezing, or pain with breathing  Date Last Reviewed: 9/13/2015 2000-2017 [employer]. [address] [place] [address]. All rights reserved. This information is not intended as a substitute for professional medical care. Always follow your healthcare professional's instructions.                Follow-ups after your visit        Who to contact     If you  "have questions or need follow up information about today's clinic visit or your schedule please contact [facility] directly at [phone].  Normal or non-critical lab and imaging results will be communicated to you by [employer], letter or phone within 4 business days after the clinic has received the results. If you do not hear from us within 7 days, please contact the clinic through [employer] or phone. If you have a critical or abnormal lab result, we will notify you by phone as soon as possible.  Submit refill requests through [employer] or call your pharmacy and they will forward the refill request to us. Please allow 3 business days for your refill to be completed.          Additional Information About Your Visit        [employer] Information     [employer] gives you secure access to your electronic health record. If you see a primary care provider, you can also send messages to your care team and make appointments. If you have questions, please call your primary care clinic.  If you do not have a primary care provider, please call [phone] and they will assist you.        Care EveryWhere ID     This is your Care EveryWhere ID. This could be used by other organizations to access your [facility] medical records  [id number]        Your Vitals Were     Pulse Temperature Height Pulse Oximetry BMI (Body Mass Index)       90 97.4  F (36.3  C) (Tympanic) 5' 6\" (1.676 m) 99% 22.92 kg/m2        Blood Pressure from Last 3 Encounters:   07/07/18 136/67   04/26/18 121/69   02/13/17 111/75    Weight from Last 3 Encounters:   07/07/18 142 lb (64.4 kg)   04/26/18 146 lb (66.2 kg)   02/13/17 139 lb 4.8 oz (63.2 kg)              Today, you had the following     No orders found for display         Today's Medication Changes          These changes are accurate as of 7/7/18 11:14 AM.  If you have any questions, ask your nurse or doctor.               Start taking these medicines.        Dose/Directions    albuterol " 108 (90 Base) MCG/ACT Inhaler   Commonly known as:  PROAIR HFA/PROVENTIL HFA/VENTOLIN HFA   Used for:  Acute bronchitis, viral   Started by:  Aida Martinez MD        Dose:  2 puff   Inhale 2 puffs into the lungs every 6 hours as needed for shortness of breath / dyspnea or wheezing   Quantity:  1 Inhaler   Refills:  0       fluticasone 50 MCG/ACT spray   Commonly known as:  FLONASE   Used for:  Acute bronchitis, viral   Started by:  Aida Martinez MD        Dose:  1-2 spray   Spray 1-2 sprays into both nostrils daily   Quantity:  1 Bottle   Refills:  0            Where to get your medicines      These medications were sent to [facility] - [place], [place] - [address]  [address], [place] [address]     Phone:  [phone]     albuterol 108 (90 Base) MCG/ACT Inhaler    fluticasone 50 MCG/ACT spray                Primary Care Provider Fax #    Physician No Ref-Primary [phone]       No address on file        Equal Access to Services     [facility]: Hadii krys alvarezo Sobertha, waaxda luqadaha, qaybta kaalmada ademaiyadaly, mkai bird . So [facility] [phone].    ATENCIÓN: Si habla español, tiene a fang disposición servicios gratuitos de asistencia lingüística. Llame al [phone].    We comply with applicable federal civil rights laws and [place] laws. We do not discriminate on the basis of race, color, national origin, age, disability, sex, sexual orientation, or gender identity.            Thank you!     Thank you for choosing [facility]  for your care. Our goal is always to provide you with excellent care. Hearing back from our patients is one way we can continue to improve our services. Please take a few minutes to complete the written survey that you may receive in the mail after your visit with us. Thank you!             Your Updated Medication List - Protect others around you: Learn how to safely use,  store and throw away your medicines at www.disposemymeds.org.          This list is accurate as of 7/7/18 11:14 AM.  Always use your most recent med list.                   Brand Name Dispense Instructions for use Diagnosis    albuterol 108 (90 Base) MCG/ACT Inhaler    PROAIR HFA/PROVENTIL HFA/VENTOLIN HFA    1 Inhaler    Inhale 2 puffs into the lungs every 6 hours as needed for shortness of breath / dyspnea or wheezing    Acute bronchitis, viral       cyclobenzaprine 5 MG tablet    FLEXERIL    30 tablet    Take 1 tablet (5 mg) by mouth every 8 hours as needed    Back strain, initial encounter       fluticasone 50 MCG/ACT spray    FLONASE    1 Bottle    Spray 1-2 sprays into both nostrils daily    Acute bronchitis, viral       PRENATAL VITAMIN PO

## 2018-07-07 NOTE — PROGRESS NOTES
"SUBJECTIVE:   Nancy Guardado is a 32 year old female presenting with a chief complaint of   Chief Complaint   Patient presents with     Urgent Care     Pt in clinic c/o cough and congestion.     Respiratory Problems       She is an established patient of [facility].    URI Adult    Onset of symptoms was 2 week(s) ago.  Course of illness is worsening.      Current and Associated symptoms: uri symptoms  at first  cough - non-productive and settling in chest  Affecting exercise  Treatment measures tried include OTC Cough med.  Predisposing factors include ill contact: Family member      Review of Systems   Constitutional: Negative for fever.       Past Medical History:   Diagnosis Date     Anxiety     Symptoms happen at night As needed took lorazapam, Ambien     Kidney abnormality of fetus on prenatal ultrasound 2016    Renal pelvis 3.1mm on level II US performed on 2016 : Follow up u/s done, \"Fetal anatomy normal, Renal pelves measure normally,\" EFW 1373, 74%tile, BRYAN 20.8      Panic attacks     Started in [place], has been on several medications      labor 2016     Family History   Problem Relation Age of Onset     Breast Cancer Paternal Grandmother      Type 2 Diabetes Paternal Grandfather      Hypertension Father      Thyroid Disease Mother      removed     Autoimmune Disease Maternal Grandmother      Chronic Obstructive Pulmonary Disease Maternal Grandmother      Colon Cancer Maternal Grandfather      Current Outpatient Prescriptions   Medication Sig Dispense Refill     albuterol (PROAIR HFA/PROVENTIL HFA/VENTOLIN HFA) 108 (90 Base) MCG/ACT Inhaler Inhale 2 puffs into the lungs every 6 hours as needed for shortness of breath / dyspnea or wheezing 1 Inhaler 0     cyclobenzaprine (FLEXERIL) 5 MG tablet Take 1 tablet (5 mg) by mouth every 8 hours as needed (Patient not taking: Reported on 2018) 30 tablet 0     fluticasone (FLONASE) 50 MCG/ACT spray Spray 1-2 sprays into both " "nostrils daily 1 Bottle 0     Prenatal Vit-Fe Fumarate-FA (PRENATAL VITAMIN PO)        Social History   Substance Use Topics     Smoking status: Former Smoker     Years: 10.00     Start date: 1/1/2012     Smokeless tobacco: Never Used      Comment: on/off     Alcohol use No      Comment: not during pregnancy       OBJECTIVE  /67  Pulse 90  Temp 97.4  F (36.3  C) (Tympanic)  Ht 5' 6\" (1.676 m)  Wt 142 lb (64.4 kg)  SpO2 99%  BMI 22.92 kg/m2    Physical Exam   Constitutional: She appears well-developed and well-nourished.   HENT:   Mouth/Throat: Oropharynx is clear and moist. No oropharyngeal exudate.   Tm nl b  PND   Cardiovascular: Normal rate, regular rhythm and normal heart sounds.    Pulmonary/Chest: Effort normal and breath sounds normal.   Lymphadenopathy:     She has no cervical adenopathy.       Labs:  No results found for this or any previous visit (from the past 24 hour(s)).        ASSESSMENT:      ICD-10-CM    1. Acute bronchitis, viral J20.8 albuterol (PROAIR HFA/PROVENTIL HFA/VENTOLIN HFA) 108 (90 Base) MCG/ACT Inhaler     fluticasone (FLONASE) 50 MCG/ACT spray        Medical Decision Making:      Patient Instructions   Albuterol inhaler  flonase nasal spray    Call or return to clinic if symptoms worsen or fail to improve as anticipated.      Viral Bronchitis (Adult)    You have a viral bronchitis. Bronchitis is inflammation and swelling of the lining of the lungs. This is often caused by an infection. Symptoms include a dry, hacking cough that is worse at night. The cough may bring up yellow-green mucus. You may also feel short of breath or wheeze. Other symptoms may include tiredness, chest discomfort, and chills.  Bronchitis that is caused by a virus is not treated with antibiotics. Instead, medicines may be given to help relieve symptoms. Symptoms can last up to 2 weeks, although the cough may last much longer.  This illness is contagious during the first few days and is spread through " the air by coughing and sneezing, or by direct contact (touching the sick person and then touching your own eyes, nose, or mouth).  Most viral illnesses resolve within 10 to 14 days with rest and simple home remedies, although they may sometimes last for several weeks.  Home care    If symptoms are severe, rest at home for the first 2 to 3 days. When you go back to your usual activities, don't let yourself get too tired.    Do not smoke. Also avoid being exposed to secondhand smoke.    You may use over-the-counter medicine to control fever or pain, unless another pain medicine was prescribed. If you have chronic liver or kidney disease or have ever had a stomach ulcer or gastrointestinal bleeding, talk with your healthcare provider before using these medicines. Also talk to your provider if you are taking medicine to prevent blood clots. Aspirin should never be given to anyone younger than 18 years of age who is ill with a viral infection or fever. It may cause severe liver or brain damage.    Your appetite may be poor, so a light diet is fine. Avoid dehydration by drinking 6 to 8 glasses of fluids per day (such as water, soft drinks, sports drinks, juices, tea, or soup). Extra fluids will help loosen secretions in the nose and lungs.    Over-the-counter cough, cold, and sore-throat medicines will not shorten the length of the illness, but they may help to reduce symptoms. Don't use decongestants if you have high blood pressure.  Follow-up care  Follow up with your healthcare provider, or as advised. If you had an X-ray or ECG (electrocardiogram), a specialist will review it. You will be notified of any new findings that may affect your care.  If you are age 65 or older, or if you have a chronic lung disease or condition that affects your immune system, or you smoke, ask your healthcare provider about getting a pneumococcal vaccine and a yearly flu shot (influenza vaccine).  When to seek medical advice  Call your  healthcare provider right away if any of these occur:    Fever of 100.4 F (38 C) or higher, or as directed by your healthcare provider    Coughing up increased amounts of colored sputum    Weakness, drowsiness, headache, facial pain, ear pain, or a stiff neck  Call 911  Call 911 if any of these occur:    Coughing up blood    Worsening weakness, drowsiness, headache, or stiff neck    Trouble breathing, wheezing, or pain with breathing  Date Last Reviewed: 9/13/2015 2000-2017 [employer]. [address] [place] [address]. All rights reserved. This information is not intended as a substitute for professional medical care. Always follow your healthcare professional's instructions.

## 2018-07-07 NOTE — PATIENT INSTRUCTIONS
Albuterol inhaler  flonase nasal spray    Call or return to clinic if symptoms worsen or fail to improve as anticipated.      Viral Bronchitis (Adult)    You have a viral bronchitis. Bronchitis is inflammation and swelling of the lining of the lungs. This is often caused by an infection. Symptoms include a dry, hacking cough that is worse at night. The cough may bring up yellow-green mucus. You may also feel short of breath or wheeze. Other symptoms may include tiredness, chest discomfort, and chills.  Bronchitis that is caused by a virus is not treated with antibiotics. Instead, medicines may be given to help relieve symptoms. Symptoms can last up to 2 weeks, although the cough may last much longer.  This illness is contagious during the first few days and is spread through the air by coughing and sneezing, or by direct contact (touching the sick person and then touching your own eyes, nose, or mouth).  Most viral illnesses resolve within 10 to 14 days with rest and simple home remedies, although they may sometimes last for several weeks.  Home care    If symptoms are severe, rest at home for the first 2 to 3 days. When you go back to your usual activities, don't let yourself get too tired.    Do not smoke. Also avoid being exposed to secondhand smoke.    You may use over-the-counter medicine to control fever or pain, unless another pain medicine was prescribed. If you have chronic liver or kidney disease or have ever had a stomach ulcer or gastrointestinal bleeding, talk with your healthcare provider before using these medicines. Also talk to your provider if you are taking medicine to prevent blood clots. Aspirin should never be given to anyone younger than 18 years of age who is ill with a viral infection or fever. It may cause severe liver or brain damage.    Your appetite may be poor, so a light diet is fine. Avoid dehydration by drinking 6 to 8 glasses of fluids per day (such as water, soft drinks, sports  drinks, juices, tea, or soup). Extra fluids will help loosen secretions in the nose and lungs.    Over-the-counter cough, cold, and sore-throat medicines will not shorten the length of the illness, but they may help to reduce symptoms. Don't use decongestants if you have high blood pressure.  Follow-up care  Follow up with your healthcare provider, or as advised. If you had an X-ray or ECG (electrocardiogram), a specialist will review it. You will be notified of any new findings that may affect your care.  If you are age 65 or older, or if you have a chronic lung disease or condition that affects your immune system, or you smoke, ask your healthcare provider about getting a pneumococcal vaccine and a yearly flu shot (influenza vaccine).  When to seek medical advice  Call your healthcare provider right away if any of these occur:    Fever of 100.4 F (38 C) or higher, or as directed by your healthcare provider    Coughing up increased amounts of colored sputum    Weakness, drowsiness, headache, facial pain, ear pain, or a stiff neck  Call 911  Call 911 if any of these occur:    Coughing up blood    Worsening weakness, drowsiness, headache, or stiff neck    Trouble breathing, wheezing, or pain with breathing  Date Last Reviewed: 9/13/2015 2000-2017 [employer]. [address] [place] [address], [place] [address]. All rights reserved. This information is not intended as a substitute for professional medical care. Always follow your healthcare professional's instructions.

## 2018-08-24 ENCOUNTER — RADIANT APPOINTMENT (OUTPATIENT)
Dept: GENERAL RADIOLOGY | Facility: CLINIC | Age: 33
End: 2018-08-24
Attending: FAMILY MEDICINE

## 2018-08-24 ENCOUNTER — OFFICE VISIT (OUTPATIENT)
Dept: ORTHOPEDICS | Facility: CLINIC | Age: 33
End: 2018-08-24

## 2018-08-24 VITALS
WEIGHT: 142 LBS | SYSTOLIC BLOOD PRESSURE: 126 MMHG | HEART RATE: 80 BPM | DIASTOLIC BLOOD PRESSURE: 76 MMHG | BODY MASS INDEX: 22.82 KG/M2 | HEIGHT: 66 IN

## 2018-08-24 DIAGNOSIS — R29.898 WEAKNESS OF RIGHT HIP: ICD-10-CM

## 2018-08-24 DIAGNOSIS — M79.672 LEFT FOOT PAIN: Primary | ICD-10-CM

## 2018-08-24 DIAGNOSIS — S99.912A INJURY OF LEFT ANKLE, INITIAL ENCOUNTER: ICD-10-CM

## 2018-08-24 DIAGNOSIS — M79.672 LEFT FOOT PAIN: ICD-10-CM

## 2018-08-24 NOTE — MR AVS SNAPSHOT
"              After Visit Summary   8/24/2018    Nancy Guardado    MRN: [id number]           Patient Information     Date Of Birth          1985        Visit Information        Provider Department      8/24/2018 8:20 AM Edwina Bernal MD [facility]        Today's Diagnoses     Left foot pain    -  1    Weakness of right hip        Injury of left ankle, initial encounter           Follow-ups after your visit        Additional Services     PHYSICAL THERAPY REFERRAL (Internal)       Physical Therapy Referral: gait analysis  Shwetha Ogden                  Follow-up notes from your care team     Return if symptoms worsen or fail to improve.      Who to contact     Please call your clinic at [phone] to:    Ask questions about your health    Make or cancel appointments    Discuss your medicines    Learn about your test results    Speak to your doctor            Additional Information About Your Visit        MyChart Information     [employer] gives you secure access to your electronic health record. If you see a primary care provider, you can also send messages to your care team and make appointments. If you have questions, please call your primary care clinic.  If you do not have a primary care provider, please call [phone] and they will assist you.      [employer] is an electronic gateway that provides easy, online access to your medical records. With [employer], you can request a clinic appointment, read your test results, renew a prescription or communicate with your care team.     To access your existing account, please contact your [facility] or call [phone] for assistance.        Care EveryWhere ID     This is your Care EveryWhere ID. This could be used by other organizations to access your [facility] medical records  [id number]        Your Vitals Were     Pulse Height BMI (Body Mass Index)             80 5' 6\" (1.676 m) " 22.92 kg/m2          Blood Pressure from Last 3 Encounters:   08/24/18 126/76   07/07/18 136/67   04/26/18 121/69    Weight from Last 3 Encounters:   08/24/18 142 lb (64.4 kg)   07/07/18 142 lb (64.4 kg)   04/26/18 146 lb (66.2 kg)              We Performed the Following     PHYSICAL THERAPY REFERRAL (Internal)        Primary Care Provider Fax #    Physician No Ref-Primary [phone]       No address on file        Equal Access to Services     JAVIER PADILLA : Hadii aad ku hadasho Soomaali, waaxda luqadaha, qaybta kaalmada adeegyada, maki bird . So [facility] [phone].    ATENCIÓN: Si habla español, tiene a fang disposición servicios gratuitos de asistencia lingüística. Llame al [phone].    We comply with applicable federal civil rights laws and [place] laws. We do not discriminate on the basis of race, color, national origin, age, disability, sex, sexual orientation, or gender identity.            Thank you!     Thank you for choosing [facility]  for your care. Our goal is always to provide you with excellent care. Hearing back from our patients is one way we can continue to improve our services. Please take a few minutes to complete the written survey that you may receive in the mail after your visit with us. Thank you!             Your Updated Medication List - Protect others around you: Learn how to safely use, store and throw away your medicines at www.disposemymeds.org.          This list is accurate as of 8/24/18 10:33 AM.  Always use your most recent med list.                   Brand Name Dispense Instructions for use Diagnosis    albuterol 108 (90 Base) MCG/ACT inhaler    PROAIR HFA/PROVENTIL HFA/VENTOLIN HFA    1 Inhaler    Inhale 2 puffs into the lungs every 6 hours as needed for shortness of breath / dyspnea or wheezing    Acute bronchitis, viral       cyclobenzaprine 5 MG tablet    FLEXERIL    30 tablet    Take 1 tablet (5 mg) by  mouth every 8 hours as needed    Back strain, initial encounter       fluticasone 50 MCG/ACT spray    FLONASE    1 Bottle    Spray 1-2 sprays into both nostrils daily    Acute bronchitis, viral       PRENATAL VITAMIN PO

## 2018-08-24 NOTE — LETTER
8/24/2018       RE: Nancy Guardado  [address]  [facility] [address]     Dear Colleague,    Thank you for referring your patient, Nancy Guardado, to the [facility] SPORTS AND ORTHOPAED[facility] WALK IN CLINIC at [facility]. Please see a copy of my visit note below.          SPORTS & ORTHOPEDIC WALK-IN VISIT 8/24/2018    [facility]  Hx of foot dislocation/lis franc fx May 2012 [facility] [place] [facility]. Surgery, still has hardware. Has never been 100% after. Has became more active and seems to be more bothersome.    Reason for visit:     What part of your body is injured / painful?  left foot    What caused the injury /pain? No inciting event     How long ago did your injury occur or pain begin? problem is longstanding    What are your most bothersome symptoms? Pain    How would you characterize your symptom?  aching, stabbing and throbing    What makes your symptoms better? Rest, Ice and Ibuprofen    What makes your symptoms worse? Walking and Movement    Have you been previously seen for this problem? No    Medical History:    Any recent changes to your medical history? No    Any new medication prescribed since last visit? No    Have you had surgery on this body part before? Yes    Social History:    Occupation: [employer]    Handedness: Right    Exercise: Most days    Review of Systems:    Do you have fever, chills, weight loss? No    Do you have any vision problems? No    Do you have any chest pain or edema? No    Do you have any shortness of breath or wheezing?  No    Do you have stomach problems? No    Do you have any numbness or focal weakness? No    Do you have diabetes? No    Do you have problems with bleeding or clotting? No    Do you have an rashes or other skin lesions? No             SUBJECTIVE: Nancy Guardado is a 32 year old female who Lis Franc injury in 2012.  Pushed off a ledge at a baseball game and landed on her feet, dislocated.  4 surgeries,  "wound vac.  [place] [facility] [place] by compartment syndrome.  Fasciotomies done.  Rehab, PT through the .  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  Able to run 9 months post injury.  Passed her fitness test.  Trying to train and run.  Running or walking is painful and has been acting up.  Last ev2013.  Bike or swim modifications.  Improves with movement  Family reunion on Saturday, next day limping.   boots- more cushion causes more pain.  Zero drop shoe.  Can run but gets left ankle pain and knee pain, still in .  Training is hard, now looking to alternatives  Works in clinical setting  Right knee aches, working on strengthening it.  - 1.5 months early, at [facility] for 1 month    PAST MEDICAL, SOCIAL, SURGICAL AND FAMILY HISTORY: She  has a past medical history of Anxiety (); Kidney abnormality of fetus on prenatal ultrasound (2016); Panic attacks (); and  labor (2016).  She  has a past surgical history that includes Foot surgery (2012).  Her family history includes Autoimmune Disease in her maternal grandmother; Breast Cancer in her paternal grandmother; Chronic Obstructive Pulmonary Disease in her maternal grandmother; Colon Cancer in her maternal grandfather; Hypertension in her father; Thyroid Disease in her mother; Type 2 Diabetes in her paternal grandfather.  She reports that she has quit smoking. She started smoking about 6 years ago. She quit after 10.00 years of use. She has never used smokeless tobacco. She reports that she does not drink alcohol or use illicit drugs.      ALLERGIES: She is allergic to ceclor [cefaclor].    CURRENT MEDICATIONS: She has a current medication list which includes the following prescription(s): albuterol, cyclobenzaprine, fluticasone, and prenatal vit-fe fumarate-fa.     REVIEW OF SYSTEMS: 10 point review of systems is negative except as noted above.    EXAM:  /76  Pulse 80  Ht 5' 6\" (1.676 m)  Wt 142 lb (64.4 kg)  BMI " 22.92 kg/m2  CONSTITUTIONIAL: healthy, alert, no distress and cooperative  HEAD: Normocephalic. No masses, lesions, tenderness or abnormalities  ENT: ENT exam normal, no neck nodes or sinus tenderness  SKIN: no suspicious lesions or rashes  GAIT: antalgic  Stance: normal  NEUROLOGIC: Normal muscle tone and strength, reflexes normal, sensation grossly normal.  PSYCHIATRIC: affect normal/bright and mentation appears normal.    MUSCULOSKELETAL: left foot pain    ANKLE  Inspection/Palpation:     Swelling: no swelling   Tender: ankle mortise     Non-tender: ATFL, CFL, PTFL, medial malleolus, deltoid ligament, anterior tib-fib ligament, achilles  tendon, no achilles tendon defect   Range of Motion: dorsiflexion: full, plantarflexion: full, inversion: full, eversion: full  Strength:dorsiflexion: 5/5, plantarflexion: 5/5, inversion: 5/5, eversion: 5/5   Special tests: negative anterior drawer, negative varus stress, negative valgus stress, negative forced external rotation, negative Merrill sign     FOOT  Inspection/Palpation:      Swelling: no swelling  Tender: mid foot     Non-tender: promixal 5th metatarsal, 1st, 2nd, 3rd, 4th, 5th metatarsals, calcaneous, cuboid,  navicular, cuneiform lateral, cuneiform middle, cuneiform medial, metatarsal heads, peroneal tendon: at lateral malleolus, at cuboid, at proximal 5th metatarsal, posterior tibial tendon at medial malleolus, posterior tibial tendon at navicular, plantar fascia  Range of Motion: flexion of toes: full, extension of toes: full        ASSESSMENT/PLAN:  Pt is a 33 yo white female with PMHx of LisFranc injury left foot presenting with left foot and ankle pain  1. Left foot and ankle pain- past history of Lis Franc, did a lot of rehab at the time of injury  2. Right hip weakness- glute strengthening needed, see PT, gait analysis, would like to have pt find alternative to running  Filled out  form and she can do physical test with swim or biking  PT for  strengthening    X-RAY INTERPRETATION:   X-Ray of the Left Foot: 3-view, ap, lateral, oblique   ordered and interpreted in the office today was positive for IMPRESSION:  1. Stable postsurgical changes of fusion of the tarsometatarsal joints  involving the first, second and third digits.  2. Ankle mortise is normally aligned.  X-ray left ankle as above.    Again, thank you for allowing me to participate in the care of your patient.      Sincerely,    Edwina Bernal MD

## 2018-08-24 NOTE — PROGRESS NOTES
SUBJECTIVE: Nancy Guardado is a 32 year old female who Lis Franc injury in .  Pushed off a ledge at a baseball game and landed on her feet, dislocated.  4 surgeries, wound vac.  [place] at [facility] complicated by compartment syndrome.  Fasciotomies done.  Rehab, PT through the .  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  Able to run 9 months post injury.  Passed her fitness test.  Trying to train and run.  Running or walking is painful and has been acting up.  Last ev2013.  Bike or swim modifications.  Improves with movement  Family reunion on Saturday, next day limping.   boots- more cushion causes more pain.  Zero drop shoe.  Can run but gets left ankle pain and knee pain, still in .  Training is hard, now looking to alternatives  Works in clinical setting  Right knee aches, working on strengthening it.  - 1.5 months early, at [facility] for 1 month    PAST MEDICAL, SOCIAL, SURGICAL AND FAMILY HISTORY: She  has a past medical history of Anxiety (); Kidney abnormality of fetus on prenatal ultrasound (2016); Panic attacks (); and  labor (2016).  She  has a past surgical history that includes Foot surgery (2012).  Her family history includes Autoimmune Disease in her maternal grandmother; Breast Cancer in her paternal grandmother; Chronic Obstructive Pulmonary Disease in her maternal grandmother; Colon Cancer in her maternal grandfather; Hypertension in her father; Thyroid Disease in her mother; Type 2 Diabetes in her paternal grandfather.  She reports that she has quit smoking. She started smoking about 6 years ago. She quit after 10.00 years of use. She has never used smokeless tobacco. She reports that she does not drink alcohol or use illicit drugs.      ALLERGIES: She is allergic to ceclor [cefaclor].    CURRENT MEDICATIONS: She has a current medication list which includes the following prescription(s): albuterol, cyclobenzaprine, fluticasone, and prenatal vit-fe  "fumarate-fa.     REVIEW OF SYSTEMS: 10 point review of systems is negative except as noted above.    EXAM:  /76  Pulse 80  Ht 5' 6\" (1.676 m)  Wt 142 lb (64.4 kg)  BMI 22.92 kg/m2  CONSTITUTIONIAL: healthy, alert, no distress and cooperative  HEAD: Normocephalic. No masses, lesions, tenderness or abnormalities  ENT: ENT exam normal, no neck nodes or sinus tenderness  SKIN: no suspicious lesions or rashes  GAIT: antalgic  Stance: normal  NEUROLOGIC: Normal muscle tone and strength, reflexes normal, sensation grossly normal.  PSYCHIATRIC: affect normal/bright and mentation appears normal.    MUSCULOSKELETAL: left foot pain    ANKLE  Inspection/Palpation:     Swelling: no swelling   Tender: ankle mortise     Non-tender: ATFL, CFL, PTFL, medial malleolus, deltoid ligament, anterior tib-fib ligament, achilles  tendon, no achilles tendon defect   Range of Motion: dorsiflexion: full, plantarflexion: full, inversion: full, eversion: full  Strength:dorsiflexion: 5/5, plantarflexion: 5/5, inversion: 5/5, eversion: 5/5   Special tests: negative anterior drawer, negative varus stress, negative valgus stress, negative forced external rotation, negative Merrill sign     FOOT  Inspection/Palpation:      Swelling: no swelling  Tender: mid foot     Non-tender: promixal 5th metatarsal, 1st, 2nd, 3rd, 4th, 5th metatarsals, calcaneous, cuboid,  navicular, cuneiform lateral, cuneiform middle, cuneiform medial, metatarsal heads, peroneal tendon: at lateral malleolus, at cuboid, at proximal 5th metatarsal, posterior tibial tendon at medial malleolus, posterior tibial tendon at navicular, plantar fascia  Range of Motion: flexion of toes: full, extension of toes: full        ASSESSMENT/PLAN:  Pt is a 31 yo white female with PMHx of LisFranc injury left foot presenting with left foot and ankle pain  1. Left foot and ankle pain- past history of Lis Franc, did a lot of rehab at the time of injury  2. Right hip weakness- glute " strengthening needed, see PT, gait analysis, would like to have pt find alternative to running  Filled out  form and she can do physical test with swim or biking  PT for strengthening    X-RAY INTERPRETATION:   X-Ray of the Left Foot: 3-view, ap, lateral, oblique   ordered and interpreted in the office today was positive for IMPRESSION:  1. Stable postsurgical changes of fusion of the tarsometatarsal joints  involving the first, second and third digits.  2. Ankle mortise is normally aligned.  X-ray left ankle as above.

## 2018-08-24 NOTE — PROGRESS NOTES
SPORTS & ORTHOPEDIC WALK-IN VISIT 8/24/2018    [facility]  Hx of foot dislocation/lis franc fx May 2012 [facility] [place]. Surgery, still has hardware. Has never been 100% after. Has became more active and seems to be more bothersome.    Reason for visit:     What part of your body is injured / painful?  left foot    What caused the injury /pain? No inciting event     How long ago did your injury occur or pain begin? problem is longstanding    What are your most bothersome symptoms? Pain    How would you characterize your symptom?  aching, stabbing and throbing    What makes your symptoms better? Rest, Ice and Ibuprofen    What makes your symptoms worse? Walking and Movement    Have you been previously seen for this problem? No    Medical History:    Any recent changes to your medical history? No    Any new medication prescribed since last visit? No    Have you had surgery on this body part before? Yes    Social History:    Occupation: [employer]    Handedness: Right    Exercise: Most days    Review of Systems:    Do you have fever, chills, weight loss? No    Do you have any vision problems? No    Do you have any chest pain or edema? No    Do you have any shortness of breath or wheezing?  No    Do you have stomach problems? No    Do you have any numbness or focal weakness? No    Do you have diabetes? No    Do you have problems with bleeding or clotting? No    Do you have an rashes or other skin lesions? No

## 2018-12-27 ENCOUNTER — OFFICE VISIT (OUTPATIENT)
Dept: URGENT CARE | Facility: URGENT CARE | Age: 33
End: 2018-12-27
Payer: COMMERCIAL

## 2018-12-27 VITALS
OXYGEN SATURATION: 96 % | SYSTOLIC BLOOD PRESSURE: 120 MMHG | DIASTOLIC BLOOD PRESSURE: 76 MMHG | TEMPERATURE: 97.8 F | HEART RATE: 74 BPM | BODY MASS INDEX: 23.61 KG/M2 | WEIGHT: 146.3 LBS

## 2018-12-27 DIAGNOSIS — R68.89 FLU-LIKE SYMPTOMS: ICD-10-CM

## 2018-12-27 DIAGNOSIS — R52 BODY ACHES: Primary | ICD-10-CM

## 2018-12-27 DIAGNOSIS — R05.8 DRY COUGH: ICD-10-CM

## 2018-12-27 LAB
FLUAV+FLUBV AG SPEC QL: NEGATIVE
FLUAV+FLUBV AG SPEC QL: NEGATIVE
SPECIMEN SOURCE: NORMAL

## 2018-12-27 PROCEDURE — 99213 OFFICE O/P EST LOW 20 MIN: CPT | Performed by: FAMILY MEDICINE

## 2018-12-27 PROCEDURE — 87804 INFLUENZA ASSAY W/OPTIC: CPT | Performed by: FAMILY MEDICINE

## 2018-12-27 NOTE — PROGRESS NOTES
"SUBJECTIVE:   Nancy Guardado is a 33 year old female presenting with a chief complaint of fever, stuffy nose, cough - non-productive, sore throat, facial pain/pressure, headache and body aches. She is a new patient of [facility].  Onset of symptoms was 2 day(s) ago.  Course of illness is worsening.    Severity moderate  Current and Associated symptoms: fever, stuffy nose, cough - non-productive, sore throat and facial pain/pressure  Treatment measures tried include Tylenol/Ibuprofen.  Predisposing factors include None.    Past Medical History:   Diagnosis Date     Anxiety     Symptoms happen at night As needed took lorazapam, Ambien     Kidney abnormality of fetus on prenatal ultrasound 2016    Renal pelvis 3.1mm on level II US performed on 2016 : Follow up u/s done, \"Fetal anatomy normal, Renal pelves measure normally,\" EFW 1373, 74%tile, BRYAN 20.8      Panic attacks     Started in [place], has been on several medications      labor 2016     Current Outpatient Medications   Medication Sig Dispense Refill     Prenatal Vit-Fe Fumarate-FA (PRENATAL VITAMIN PO)        albuterol (PROAIR HFA/PROVENTIL HFA/VENTOLIN HFA) 108 (90 Base) MCG/ACT Inhaler Inhale 2 puffs into the lungs every 6 hours as needed for shortness of breath / dyspnea or wheezing (Patient not taking: Reported on 2018) 1 Inhaler 0     cyclobenzaprine (FLEXERIL) 5 MG tablet Take 1 tablet (5 mg) by mouth every 8 hours as needed (Patient not taking: Reported on 2018) 30 tablet 0     fluticasone (FLONASE) 50 MCG/ACT spray Spray 1-2 sprays into both nostrils daily (Patient not taking: Reported on 2018) 1 Bottle 0     Social History     Tobacco Use     Smoking status: Former Smoker     Years: 10.00     Start date: 2012     Smokeless tobacco: Never Used     Tobacco comment: on/off   Substance Use Topics     Alcohol use: No     Alcohol/week: 0.0 oz     Comment: not during pregnancy     Family History "   Problem Relation Age of Onset     Breast Cancer Paternal Grandmother      Diabetes Type 2  Paternal Grandfather      Hypertension Father      Thyroid Disease Mother         removed     Autoimmune Disease Maternal Grandmother      Chronic Obstructive Pulmonary Disease Maternal Grandmother      Colon Cancer Maternal Grandfather          ROS:    10 point ROS of systems including, Eyes,Cardiovascular, Gastroenterology, Genitourinary, Integumentary, Muscularskeletal, Psychiatric were all negative except for pertinent positives noted in my HPI         OBJECTIVE:  /76   Pulse 74   Temp 97.8  F (36.6  C) (Tympanic)   Wt 66.4 kg (146 lb 4.8 oz)   SpO2 96%   BMI 23.61 kg/m    GENERAL APPEARANCE: healthy, alert and no distress  EYES: EOMI,  PERRL, conjunctiva clear  HENT: ear canals and TM's normal.  Nose and mouth without ulcers, erythema or lesions  NECK: supple, nontender, no lymphadenopathy  RESP: lungs clear to auscultation - no rales, rhonchi or wheezes  CV: regular rates and rhythm, normal S1 S2, no murmur noted  ABDOMEN:  soft, nontender, no HSM or masses and bowel sounds normal  SKIN: no suspicious lesions or rashes  Physical Exam      X-Ray was not done.      ASSESSMENT:  Nancy was seen today for urgent care and fever.    Diagnoses and all orders for this visit:    Body aches  -     Influenza A/B antigen    Dry cough    Flu-like symptoms          PLAN:  Tylenol, Ibuprofen, Fluids, Rest, Saline gargles, Saline nasal spray and Vaporizer  Notified pt about negative flu test   Follow up if  symptoms fail to improve or worsens   Pt understood and agreed with plan       Denia Lara MD     See orders in Epic

## 2020-03-10 ENCOUNTER — HEALTH MAINTENANCE LETTER (OUTPATIENT)
Age: 35
End: 2020-03-10

## 2020-12-27 ENCOUNTER — HEALTH MAINTENANCE LETTER (OUTPATIENT)
Age: 35
End: 2020-12-27

## 2021-04-24 ENCOUNTER — HEALTH MAINTENANCE LETTER (OUTPATIENT)
Age: 36
End: 2021-04-24

## 2021-10-09 ENCOUNTER — HEALTH MAINTENANCE LETTER (OUTPATIENT)
Age: 36
End: 2021-10-09

## 2022-05-16 ENCOUNTER — HEALTH MAINTENANCE LETTER (OUTPATIENT)
Age: 37
End: 2022-05-16

## 2022-09-11 ENCOUNTER — HEALTH MAINTENANCE LETTER (OUTPATIENT)
Age: 37
End: 2022-09-11

## 2023-06-03 ENCOUNTER — HEALTH MAINTENANCE LETTER (OUTPATIENT)
Age: 38
End: 2023-06-03